# Patient Record
Sex: MALE | Race: WHITE | NOT HISPANIC OR LATINO | Employment: OTHER | URBAN - METROPOLITAN AREA
[De-identification: names, ages, dates, MRNs, and addresses within clinical notes are randomized per-mention and may not be internally consistent; named-entity substitution may affect disease eponyms.]

---

## 2023-11-17 ENCOUNTER — OFFICE VISIT (OUTPATIENT)
Age: 88
End: 2023-11-17
Payer: MEDICARE

## 2023-11-17 VITALS
DIASTOLIC BLOOD PRESSURE: 92 MMHG | HEART RATE: 86 BPM | WEIGHT: 170 LBS | BODY MASS INDEX: 24.34 KG/M2 | OXYGEN SATURATION: 98 % | RESPIRATION RATE: 16 BRPM | SYSTOLIC BLOOD PRESSURE: 150 MMHG | HEIGHT: 70 IN

## 2023-11-17 DIAGNOSIS — S81.011A LACERATION OF RIGHT KNEE, INITIAL ENCOUNTER: ICD-10-CM

## 2023-11-17 DIAGNOSIS — S01.81XA FACIAL LACERATION, INITIAL ENCOUNTER: ICD-10-CM

## 2023-11-17 DIAGNOSIS — S09.90XA INJURY OF HEAD, INITIAL ENCOUNTER: Primary | ICD-10-CM

## 2023-11-17 DIAGNOSIS — S61.411A LACERATION OF RIGHT HAND WITHOUT FOREIGN BODY, INITIAL ENCOUNTER: ICD-10-CM

## 2023-11-17 PROCEDURE — G0463 HOSPITAL OUTPT CLINIC VISIT: HCPCS | Performed by: EMERGENCY MEDICINE

## 2023-11-17 PROCEDURE — 99203 OFFICE O/P NEW LOW 30 MIN: CPT | Performed by: EMERGENCY MEDICINE

## 2023-11-17 RX ORDER — MEMANTINE HYDROCHLORIDE 10 MG/1
TABLET ORAL
COMMUNITY

## 2023-11-17 RX ORDER — ACETAMINOPHEN 500 MG
TABLET ORAL
COMMUNITY

## 2023-11-17 RX ORDER — TAMSULOSIN HYDROCHLORIDE 0.4 MG/1
CAPSULE ORAL
COMMUNITY

## 2023-11-17 RX ORDER — CARVEDILOL 12.5 MG/1
3.12 TABLET ORAL
COMMUNITY

## 2023-11-17 RX ORDER — SERTRALINE HYDROCHLORIDE 25 MG/1
25 TABLET, FILM COATED ORAL DAILY
COMMUNITY
Start: 2023-11-13

## 2023-11-17 RX ORDER — LOSARTAN POTASSIUM 25 MG/1
TABLET ORAL
COMMUNITY

## 2023-11-17 RX ORDER — AMIODARONE HYDROCHLORIDE 200 MG/1
TABLET ORAL
COMMUNITY

## 2023-11-17 RX ORDER — POTASSIUM CHLORIDE 750 MG/1
TABLET, FILM COATED, EXTENDED RELEASE ORAL
COMMUNITY

## 2023-11-17 RX ORDER — DOCUSATE SODIUM 100 MG/1
100 CAPSULE, LIQUID FILLED ORAL 2 TIMES DAILY
COMMUNITY

## 2023-11-17 RX ORDER — RIVAROXABAN 20 MG/1
TABLET, FILM COATED ORAL
COMMUNITY

## 2023-11-17 RX ORDER — FUROSEMIDE 20 MG/1
TABLET ORAL
COMMUNITY

## 2023-11-17 NOTE — PROGRESS NOTES
[facility]        NAME: Donte Gutiérrez is a 81 y.o. male  : 1935    MRN: [id number]  DATE: 2023  TIME: 3:55 PM    Assessment and Plan   Injury of head, initial encounter [S09.90XA]  1. Injury of head, initial encounter  Transfer to other facility      2. Facial laceration, initial encounter  Transfer to other facility      3. Laceration of right hand without foreign body, initial encounter  Transfer to other facility      4. Laceration of right knee, initial encounter  Transfer to other facility        Wounds cleansed and dressed by RN. NEXUS Head CT Instrument from [employer] on 2023  ** All calculations should be rechecked by clinician prior to use **     RESULT SUMMARY:  HIGH risk of significant intracranial injuries  CT necessary      INPUTS:  Evidence of significant skull fracture --> 0 = No  Scalp hematoma --> 0 = No  Neurologic deficit --> 0 = No  Altered level of alertness --> 0 = unknown  Abnormal behavior --> 0 = No  Coagulopathy --> 0 = Yes  Persistent vomiting --> 0 = No  Age ? 65 years --> 0 = Yes  Patient Instructions     Patient Instructions   Head Injury   WHAT YOU NEED TO KNOW:   A head injury can include your scalp, face, skull, or brain and range from mild to severe. Effects can appear immediately after the injury or develop later. The effects may last a short time or be permanent. Healthcare providers may want to check your recovery over time. Treatment may change as you recover or develop new health problems from the head injury. DISCHARGE INSTRUCTIONS:   Call your local emergency number (911 in the [place]), or have someone else call if:   You cannot be woken. You have a seizure. You stop responding to others or you faint. You have blurry or double vision. Your speech becomes slurred or confused. You have arm or leg weakness, loss of feeling, or new problems with coordination.     Your pupils are larger than usual, or one pupil is a different size than the other. You have blood or clear fluid coming out of your ears or nose. Return to the emergency department if:   You have repeated or forceful vomiting. You feel confused. Your headache gets worse or becomes severe. You or someone caring for you notices that you are harder to wake than usual.    Call your doctor if:   Your symptoms last longer than 6 weeks after the injury. You have questions or concerns about your condition or care. Medicines:   Acetaminophen  decreases pain and fever. It is available without a doctor's order. Ask how much to take and how often to take it. Follow directions. Read the labels of all other medicines you are using to see if they also contain acetaminophen, or ask your doctor or pharmacist. Acetaminophen can cause liver damage if not taken correctly. Take your medicine as directed. Contact your healthcare provider if you think your medicine is not helping or if you have side effects. Tell your provider if you are allergic to any medicine. Keep a list of the medicines, vitamins, and herbs you take. Include the amounts, and when and why you take them. Bring the list or the pill bottles to follow-up visits. Carry your medicine list with you in case of an emergency. Self-care:   Rest  or do quiet activities. Limit your time watching TV, using the computer, or doing tasks that require a lot of thinking. Slowly return to your normal activities as directed. Do not play sports or do activities that may cause you to get hit in the head. Ask your healthcare provider when you can return to sports. Apply ice  on your head for 15 to 20 minutes every hour or as directed. Use an ice pack, or put crushed ice in a plastic bag. Cover it with a towel before you apply it to your skin. Ice helps prevent tissue damage and decreases swelling and pain. Have someone stay with you for 24 hours  , or as directed.  This person can monitor you for problems and call for help if needed. When you are awake, the person should ask you a few questions every few hours to see if you are thinking clearly. An example is to ask your name or address. Prevent another head injury:   Wear a helmet that fits properly. Do this when you play sports, or ride a bike, scooter, or skateboard. Helmets help decrease your risk for a serious head injury. Talk to your healthcare provider about other ways you can protect yourself if you play sports. Wear your seatbelt every time you are in a car. This helps lower your risk for a head injury if you are in a car accident. Follow up with your doctor as directed:  Write down your questions so you remember to ask them during your visits. © Copyright Elisha [employer] 2023 Information is for End User's use only and may not be sold, redistributed or otherwise used for commercial purposes. The above information is an  only. It is not intended as medical advice for individual conditions or treatments. Talk to your doctor, nurse or pharmacist before following any medical regimen to see if it is safe and effective for you. Follow up with PCP in 3-5 days. Proceed to  ER if symptoms worsen. Chief Complaint     Chief Complaint   Patient presents with    Head Laceration     Gauri Basurto today around 2:50pm. Unwitnessed fall. Presents with right brow laceration, right hand palm laceration, right knee laceration. History of Present Illness       Patient with lacerations to face, right hand, right knee after unwitnessed fall 1 hour ago according to wife. Patient has history of dementia. Patient is on Xarelto. It is unknown whether patient had a loss of consciousness as he was found walking outside his home, bleeding from face, right hand and right knee by a fuel oil deliveryman, who did not witness patient's fall.     Head Laceration  Pertinent negatives include no arthralgias, chills, fever, headaches, joint swelling, neck pain, numbness, rash or weakness. Review of Systems   Review of Systems   Constitutional: Negative. Negative for chills and fever. HENT: Negative. Respiratory: Negative. Cardiovascular: Negative. Gastrointestinal: Negative. Endocrine: Negative. Genitourinary: Negative. Musculoskeletal: Negative. Negative for arthralgias, back pain, joint swelling, neck pain and neck stiffness. Skin:  Positive for wound. Negative for color change and rash. Neurological:  Negative for dizziness, tremors, seizures, syncope, facial asymmetry, speech difficulty, weakness, light-headedness, numbness and headaches. Psychiatric/Behavioral:  Negative for confusion and decreased concentration.           Current Medications       Current Outpatient Medications:     acetaminophen (TYLENOL) 500 mg tablet, Take by mouth, Disp: , Rfl:     amiodarone 200 mg tablet, , Disp: , Rfl:     carvedilol (COREG) 12.5 mg tablet, Take 3.125 mg by mouth, Disp: , Rfl:     docusate sodium (COLACE) 100 mg capsule, Take 100 mg by mouth 2 (two) times a day, Disp: , Rfl:     furosemide (LASIX) 20 mg tablet, 40mg on Monday and thursdays, Disp: , Rfl:     losartan (COZAAR) 25 mg tablet, Take by mouth, Disp: , Rfl:     memantine (NAMENDA) 10 mg tablet, , Disp: , Rfl:     potassium chloride (Klor-Con) 10 mEq tablet, , Disp: , Rfl:     sertraline (ZOLOFT) 25 mg tablet, Take 25 mg by mouth daily, Disp: , Rfl:     tamsulosin (FLOMAX) 0.4 mg, , Disp: , Rfl:     Xarelto 20 MG tablet, Take by mouth, Disp: , Rfl:     Current Allergies     Allergies as of 11/17/2023    (No Known Allergies)            The following portions of the patient's history were reviewed and updated as appropriate: allergies, current medications, past family history, past medical history, past social history, past surgical history and problem list.     Past Medical History:   Diagnosis Date    Dementia ([address])     History of cardiac defibrillator placement     Kidney stone        History reviewed. No pertinent surgical history. History reviewed. No pertinent family history. Medications have been verified. Objective   /92   Pulse 86   Resp 16   Ht 5' 10" (1.778 m)   Wt 77.1 kg (170 lb)   SpO2 98%   BMI 24.39 kg/m²        Physical Exam     Physical Exam  Vitals and nursing note reviewed. Constitutional:       General: He is not in acute distress. Appearance: He is well-developed. He is not ill-appearing. HENT:      Head: Normocephalic. Eyes:      Conjunctiva/sclera: Conjunctivae normal.      Pupils: Pupils are equal, round, and reactive to light. Cardiovascular:      Rate and Rhythm: Normal rate and regular rhythm. Pulmonary:      Effort: Pulmonary effort is normal.      Breath sounds: Normal breath sounds. Musculoskeletal:      Cervical back: Normal range of motion and neck supple. No rigidity. Skin:     General: Skin is warm and dry. Findings: No rash. Neurological:      Mental Status: He is alert.    Psychiatric:         Mood and Affect: Mood normal.

## 2023-11-17 NOTE — PATIENT INSTRUCTIONS
Head Injury   WHAT YOU NEED TO KNOW:   A head injury can include your scalp, face, skull, or brain and range from mild to severe. Effects can appear immediately after the injury or develop later. The effects may last a short time or be permanent. Healthcare providers may want to check your recovery over time. Treatment may change as you recover or develop new health problems from the head injury. DISCHARGE INSTRUCTIONS:   Call your local emergency number (911 in the [place]), or have someone else call if:   You cannot be woken. You have a seizure. You stop responding to others or you faint. You have blurry or double vision. Your speech becomes slurred or confused. You have arm or leg weakness, loss of feeling, or new problems with coordination. Your pupils are larger than usual, or one pupil is a different size than the other. You have blood or clear fluid coming out of your ears or nose. Return to the emergency department if:   You have repeated or forceful vomiting. You feel confused. Your headache gets worse or becomes severe. You or someone caring for you notices that you are harder to wake than usual.    Call your doctor if:   Your symptoms last longer than 6 weeks after the injury. You have questions or concerns about your condition or care. Medicines:   Acetaminophen  decreases pain and fever. It is available without a doctor's order. Ask how much to take and how often to take it. Follow directions. Read the labels of all other medicines you are using to see if they also contain acetaminophen, or ask your doctor or pharmacist. Acetaminophen can cause liver damage if not taken correctly. Take your medicine as directed. Contact your healthcare provider if you think your medicine is not helping or if you have side effects. Tell your provider if you are allergic to any medicine. Keep a list of the medicines, vitamins, and herbs you take.  Include the amounts, and when and why you take them. Bring the list or the pill bottles to follow-up visits. Carry your medicine list with you in case of an emergency. Self-care:   Rest  or do quiet activities. Limit your time watching TV, using the computer, or doing tasks that require a lot of thinking. Slowly return to your normal activities as directed. Do not play sports or do activities that may cause you to get hit in the head. Ask your healthcare provider when you can return to sports. Apply ice  on your head for 15 to 20 minutes every hour or as directed. Use an ice pack, or put crushed ice in a plastic bag. Cover it with a towel before you apply it to your skin. Ice helps prevent tissue damage and decreases swelling and pain. Have someone stay with you for 24 hours  , or as directed. This person can monitor you for problems and call for help if needed. When you are awake, the person should ask you a few questions every few hours to see if you are thinking clearly. An example is to ask your name or address. Prevent another head injury:   Wear a helmet that fits properly. Do this when you play sports, or ride a bike, scooter, or skateboard. Helmets help decrease your risk for a serious head injury. Talk to your healthcare provider about other ways you can protect yourself if you play sports. Wear your seatbelt every time you are in a car. This helps lower your risk for a head injury if you are in a car accident. Follow up with your doctor as directed:  Write down your questions so you remember to ask them during your visits. © Copyright [facility] 2023 Information is for End User's use only and may not be sold, redistributed or otherwise used for commercial purposes. The above information is an  only. It is not intended as medical advice for individual conditions or treatments. Talk to your doctor, nurse or pharmacist before following any medical regimen to see if it is safe and effective for you.

## 2024-01-04 ENCOUNTER — OFFICE VISIT (OUTPATIENT)
Age: 89
End: 2024-01-04
Payer: MEDICARE

## 2024-01-04 ENCOUNTER — APPOINTMENT (OUTPATIENT)
Age: 89
End: 2024-01-04
Payer: MEDICARE

## 2024-01-04 VITALS
RESPIRATION RATE: 16 BRPM | TEMPERATURE: 95.4 F | SYSTOLIC BLOOD PRESSURE: 142 MMHG | DIASTOLIC BLOOD PRESSURE: 94 MMHG | OXYGEN SATURATION: 98 % | HEIGHT: 67 IN | WEIGHT: 166 LBS | HEART RATE: 73 BPM | BODY MASS INDEX: 26.06 KG/M2

## 2024-01-04 DIAGNOSIS — R21 SKIN RASH: ICD-10-CM

## 2024-01-04 DIAGNOSIS — R05.8 POST-VIRAL COUGH SYNDROME: ICD-10-CM
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
## 2024-01-04 DIAGNOSIS — J20.9 ACUTE BRONCHITIS, UNSPECIFIED ORGANISM: ICD-10-CM

## 2024-01-04 DIAGNOSIS — S70.01XA CONTUSION OF RIGHT HIP, INITIAL ENCOUNTER: Primary | ICD-10-CM

## 2024-01-04 PROCEDURE — 99214 OFFICE O/P EST MOD 30 MIN: CPT | Performed by: PHYSICIAN ASSISTANT

## 2024-01-04 PROCEDURE — G0463 HOSPITAL OUTPT CLINIC VISIT: HCPCS | Performed by: PHYSICIAN ASSISTANT

## 2024-01-04 PROCEDURE — 71046 X-RAY EXAM CHEST 2 VIEWS: CPT

## 2024-01-04 PROCEDURE — 73502 X-RAY EXAM HIP UNI 2-3 VIEWS: CPT

## 2024-01-04 RX ORDER — CLOTRIMAZOLE AND BETAMETHASONE DIPROPIONATE 10; .64 MG/G; MG/G
CREAM TOPICAL 2 TIMES DAILY
Qty: 45 G | Refills: 0 | Status: SHIPPED | OUTPATIENT
Start: 2024-01-04

## 2024-01-04 RX ORDER — AMPICILLIN TRIHYDRATE 250 MG
CAPSULE ORAL
COMMUNITY

## 2024-01-04 RX ORDER — UBIDECARENONE 100 MG
CAPSULE ORAL
COMMUNITY

## 2024-01-04 RX ORDER — LANOLIN ALCOHOL/MO/W.PET/CERES
CREAM (GRAM) TOPICAL
COMMUNITY

## 2024-01-04 RX ORDER — PREDNISONE 10 MG/1
TABLET ORAL
Qty: 21 TABLET | Refills: 0 | Status: SHIPPED | OUTPATIENT
Start: 2024-01-04

## 2024-01-04 RX ORDER — BENZONATATE 100 MG/1
100 CAPSULE ORAL 3 TIMES DAILY PRN
Qty: 20 CAPSULE | Refills: 0 | Status: SHIPPED | OUTPATIENT
Start: 2024-01-04

## 2024-01-04 RX ORDER — PREDNISONE 50 MG/1
TABLET ORAL
COMMUNITY
End: 2024-01-04

## 2024-01-04 RX ORDER — CARVEDILOL 3.12 MG/1
3.12 TABLET ORAL 2 TIMES DAILY
COMMUNITY

## 2024-01-04 RX ORDER — DOXYCYCLINE 100 MG/1
100 CAPSULE ORAL 2 TIMES DAILY
Qty: 14 CAPSULE | Refills: 0 | Status: SHIPPED | OUTPATIENT
Start: 2024-01-04 | End: 2024-01-11

## 2024-01-04 RX ORDER — ERYTHROMYCIN 5 MG/G
OINTMENT OPHTHALMIC
COMMUNITY

## 2024-01-04 RX ORDER — TRIAMCINOLONE ACETONIDE 1 MG/G
CREAM TOPICAL
COMMUNITY

## 2024-01-04 RX ORDER — CODEINE PHOSPHATE AND GUAIFENESIN 10; 100 MG/5ML; MG/5ML
SOLUTION ORAL
COMMUNITY
Start: 2023-12-26

## 2024-01-04 NOTE — PATIENT INSTRUCTIONS
Hip Contusion   WHAT YOU NEED TO KNOW:   A hip contusion is a bruise that appears on the skin of your hip after an injury. A bruise happens when small blood vessels tear but the skin does not. When blood vessels tear, blood leaks into nearby tissue, such as soft tissue or muscle.  DISCHARGE INSTRUCTIONS:   Return to the emergency department if:   You have severe pain in your hip.    You have numbness in your leg or toes.    You cannot put any weight on or move your hip.    Call your doctor if:   Your pain does not decrease, even after treatment.    You have questions or concerns about your condition or care.    Medicines:   NSAIDs , such as ibuprofen, help decrease swelling, pain, and fever. This medicine is available with or without a doctor's order. NSAIDs can cause stomach bleeding or kidney problems in certain people. If you take blood thinner medicine, always ask if NSAIDs are safe for you. Always read the medicine label and follow directions. Do not give these medicines to children younger than 6 months without direction from a healthcare provider.     Take your medicine as directed.  Contact your healthcare provider if you think your medicine is not helping or if you have side effects. Tell your provider if you are allergic to any medicine. Keep a list of the medicines, vitamins, and herbs you take. Include the amounts, and when and why you take them. Bring the list or the pill bottles to follow-up visits. Carry your medicine list with you in case of an emergency.    Self-care:   Rest  your injured hip so that it can heal. You may need to avoid putting any weight on your hip for at least 48 hours. Return to normal activities as directed.    Ice  the injury for 20 minutes every 4 hours, or as directed. Use an ice pack, or put crushed ice in a plastic bag. Cover it with a towel to protect your skin. Ice helps prevent tissue damage and decreases swelling and pain.    Compress  your injury with an elastic bandage  to reduce swelling. Ask your healthcare provider how to use an elastic bandage and how tight it should be.    Elevate  your injured hip above the level of your heart as often as you can. This will help decrease swelling and pain. If possible, prop your hip and leg on pillows or blankets to keep it elevated comfortably.    Help your hip contusion heal:   Do not massage or use heat.  Heat and massage may slow healing of the area.    Do not stretch injured muscles.  Ask your healthcare provider when and how you may safely stretch after your injury.    Do not drink alcohol.  Alcohol may slow healing of your injury.    Prevent another contusion:   Stretch and warm up before you play sports or exercise.    Wear protective gear when you play sports.    If you begin a new physical activity, start slowly to give your body a chance to adjust.    Follow up with your doctor as directed:  You may need to return within a week to recheck your injury. Write down any questions you have so you remember to ask them during your visits.  © Copyright [employer] 2023 Information is for End User's use only and may not be sold, redistributed or otherwise used for commercial purposes.  The above information is an  only. It is not intended as medical advice for individual conditions or treatments. Talk to your doctor, nurse or pharmacist before following any medical regimen to see if it is safe and effective for you.      Acute Bronchitis   WHAT YOU NEED TO KNOW:   Acute bronchitis is swelling and irritation in your lungs. It is usually caused by a virus and most often happens in the winter. Bronchitis may also be caused by bacteria or by a chemical irritant, such as smoke.  DISCHARGE INSTRUCTIONS:   Return to the emergency department if:   You cough up blood.    Your lips or fingernails turn blue.    You feel like you are not getting enough air when you breathe.    Call your doctor if:   Your symptoms do not go away or get  worse, even after treatment.    Your cough does not get better within 4 weeks.    You have questions or concerns about your condition or care.    Medicines:  You may need any of the following:  Cough suppressants  decrease your urge to cough.    Decongestants  help loosen mucus in your lungs and make it easier to cough up. This can help you breathe easier.    Inhalers  may be given. Your healthcare provider may give you one or more inhalers to help you breathe easier and cough less. An inhaler gives you medicine to open your airways. Ask your healthcare provider to show you how to use your inhaler correctly.         Antiviral medicine  treats infections caused by a virus.    Antibiotics  may be given if your bronchitis is caused by bacteria or if you have lung condition.    Acetaminophen  decreases pain and fever. It is available without a doctor's order. Ask how much to take and how often to take it. Follow directions. Read the labels of all other medicines you are using to see if they also contain acetaminophen, or ask your doctor or pharmacist. Acetaminophen can cause liver damage if not taken correctly.    NSAIDs  help decrease swelling and pain or fever. This medicine is available with or without a doctor's order. NSAIDs can cause stomach bleeding or kidney problems in certain people. If you take blood thinner medicine, always ask your healthcare provider if NSAIDs are safe for you. Always read the medicine label and follow directions.    Self-care:   Drink liquids as directed.  You may need to drink more liquids than usual to stay hydrated. Ask how much liquid to drink each day and which liquids are best for you.    Use a cool mist humidifier.  This increases air moisture in your home. This may make it easier for you to breathe and help decrease your cough.     Get more rest.  Rest helps your body to heal. Slowly start to do more each day. Rest when you feel it is needed.    Prevent acute bronchitis:       Ask  about vaccines you may need.  Get a flu vaccine each year as soon as recommended, usually in September or October. Ask your healthcare provider if you should also get a pneumonia or COVID-19 vaccine. Your healthcare provider can tell you if you should also get other vaccines, and when to get them.    Prevent the spread of germs.  You can decrease your risk for acute bronchitis and other illnesses by doing the following:     Wash your hands often with soap and water. Carry germ-killing hand lotion or gel with you. You can use the lotion or gel to clean your hands when soap and water are not available.         Do not touch your eyes, nose, or mouth unless you have washed your hands first.    Always cover your mouth when you cough to prevent the spread of germs. It is best to cough into a tissue or your shirt sleeve instead of into your hand. Ask those around you to cover their mouths when they cough.    Try to avoid people who have a cold or the flu. If you are sick, stay away from others as much as possible.    Avoid irritants in the air.  Avoid chemicals, fumes, and dust. Wear a face mask if you must work around dust or fumes. Stay inside on days when air pollution levels are high. If you have allergies, stay inside when pollen counts are high. Do not use aerosol products, such as spray-on deodorant, bug spray, and hair spray.    Do not smoke or be around others who are smoking.  Nicotine and other chemicals in cigarettes and cigars can cause lung damage. Ask your healthcare provider for information if you currently smoke and need help to quit. E-cigarettes or smokeless tobacco still contain nicotine. Talk to your healthcare provider before you use these products.  Follow up with your doctor as directed:  Write down questions you have so you will remember to ask them during your follow-up visits.  © Copyright [employer] 2023 Information is for End User's use only and may not be sold, redistributed or otherwise used  for commercial purposes.  The above information is an  only. It is not intended as medical advice for individual conditions or treatments. Talk to your doctor, nurse or pharmacist before following any medical regimen to see if it is safe and effective for you.      Dermatitis   WHAT YOU NEED TO KNOW:   Dermatitis is skin inflammation. You may have an itchy rash, redness, or swelling. You may also have bumps or blisters that crust over or ooze clear fluid. Dermatitis can be caused by allergens such as dust mites, pet dander, pollen, and certain foods. It can also develop when something touches your skin and irritates it or causes an allergic reaction. Examples include soaps, chemicals, latex, and poison ivy.  DISCHARGE INSTRUCTIONS:   Call your local emergency number (911 in the [place]) or have someone call if:   You have symptoms of anaphylaxis, such as sudden trouble breathing, throat swelling, or feeling dizzy or lightheaded.      Return to the emergency department if:   You develop a fever or have red streaks going up your arm or leg.    Your rash gets more swollen, red, or hot.    Call your doctor or dermatologist if:   Your skin blisters, oozes white or yellow pus, or has a foul-smelling discharge.    Your rash spreads or does not get better, even after treatment.    You have questions or concerns about your condition or care.    Medicines:   Medicines  help decrease itching and inflammation, or treat a bacterial infection. They may be given as a topical cream, shot, or a pill.    Take your medicine as directed.  Contact your healthcare provider if you think your medicine is not helping or if you have side effects. Tell your provider if you are allergic to any medicine. Keep a list of the medicines, vitamins, and herbs you take. Include the amounts, and when and why you take them. Bring the list or the pill bottles to follow-up visits. Carry your medicine list with you in case of an  emergency.    Manage dermatitis:   Apply a cool compress to your rash.  This will help soothe your skin.    Apply lotions or creams to the area.  These help keep your skin moist and decrease itching. Apply the lotion or cream right after a lukewarm bath or shower when your skin is still damp. Use products that do not contain dye or a scent.    Avoid skin irritants.  Examples include makeup, hair products, soaps, and cleansers. Use products that do not contain a scent or dye.    Follow up with your doctor or dermatologist as directed:  Write down your questions so you remember to ask them during your visits.  © Copyright [employer] 2023 Information is for End User's use only and may not be sold, redistributed or otherwise used for commercial purposes.  The above information is an  only. It is not intended as medical advice for individual conditions or treatments. Talk to your doctor, nurse or pharmacist before following any medical regimen to see if it is safe and effective for you.

## 2024-01-05 NOTE — PROGRESS NOTES
Discussed CXR result with patient's wife (POA).  Patient afebrile today according to wife, still with productive cough.  I advised her to continue the antibiotic as prescribed continue the Tessalon as directed and have him return here or to ER if condition worsens or new symptoms develop.

## 2024-01-14 NOTE — PROGRESS NOTES
[facility]        NAME: Alfredo Thorne is a 88 y.o. male  : 1935    MRN: [id number]  DATE: 2024  TIME: 8:28 AM    Assessment and Plan   Contusion of right hip, initial encounter [S70.01XA]  1. Contusion of right hip, initial encounter        2. Fall, initial encounter  XR hip/pelv 2-3 vws right if performed      3. Post-viral cough syndrome  XR chest pa & lateral    benzonatate (TESSALON PERLES) 100 mg capsule      4. Acute bronchitis, unspecified organism  doxycycline monohydrate (MONODOX) 100 mg capsule    benzonatate (TESSALON PERLES) 100 mg capsule    predniSONE 10 mg tablet      5. Skin rash  clotrimazole-betamethasone (LOTRISONE) 1-0.05 % cream            Patient Instructions       Follow up with PCP in 3-5 days.  Proceed to  ER if symptoms worsen.    Chief Complaint     Chief Complaint   Patient presents with   • Fall     Fall yesterday morning in bathroom, hit right hip on toilet, bruised right arm and right side, wife has photos of bathroom. Hip hurts to sit and has difficulty getting in and out of the car.    • Cough     Covid positive 23. Cough began on 23. Still has the cough. Took mucinex, tylenol for a fever he had for one week, fever resolved for one week. Cough is deep and producing mucus, given guaif/codeine since 23 intermittently.          History of Present Illness       HPI    Review of Systems   Review of Systems      Current Medications       Current Outpatient Medications:   •  acetaminophen (TYLENOL) 500 mg tablet, Take by mouth, Disp: , Rfl:   •  amiodarone 200 mg tablet, , Disp: , Rfl:   •  benzonatate (TESSALON PERLES) 100 mg capsule, Take 1 capsule (100 mg total) by mouth 3 (three) times a day as needed for cough, Disp: 20 capsule, Rfl: 0  •  carvedilol (COREG) 3.125 mg tablet, Take 3.125 mg by mouth 2 (two) times a day, Disp: , Rfl:   •  clotrimazole-betamethasone (LOTRISONE) 1-0.05 % cream, Apply topically 2 (two) times a day, Disp: 45 g,  Rfl: 0  •  docusate sodium (COLACE) 100 mg capsule, Take 100 mg by mouth 2 (two) times a day, Disp: , Rfl:   •  furosemide (LASIX) 20 mg tablet, 40mg on Monday and thursdays, Disp: , Rfl:   •  guaiFENesin-codeine (ROBITUSSIN AC) 100-10 mg/5 mL oral solution, TAKE 5 ML BY MOUTH EVERY 4 HOURS AS NEEDED FOR COUGH, Disp: , Rfl:   •  losartan (COZAAR) 25 mg tablet, Take by mouth, Disp: , Rfl:   •  memantine (NAMENDA) 10 mg tablet, , Disp: , Rfl:   •  potassium chloride (Klor-Con) 10 mEq tablet, , Disp: , Rfl:   •  predniSONE 10 mg tablet, 6-5-4-3-2-1 taper with food., Disp: 21 tablet, Rfl: 0  •  sertraline (ZOLOFT) 25 mg tablet, Take 25 mg by mouth daily, Disp: , Rfl:   •  tamsulosin (FLOMAX) 0.4 mg, , Disp: , Rfl:   •  Xarelto 20 MG tablet, Take by mouth, Disp: , Rfl:   •  carvedilol (COREG) 12.5 mg tablet, Take 3.125 mg by mouth (Patient not taking: Reported on 1/4/2024), Disp: , Rfl:   •  Cinnamon 500 MG capsule, , Disp: , Rfl:   •  co-enzyme Q-10 100 mg capsule, Take by mouth (Patient not taking: Reported on 1/4/2024), Disp: , Rfl:   •  dronedarone (Multaq) 400 mg tablet, Take by mouth (Patient not taking: Reported on 1/4/2024), Disp: , Rfl:   •  erythromycin (ILOTYCIN) ophthalmic ointment, , Disp: , Rfl:   •  Magnesium Oxide (DIASENSE MAGNESIUM PO), Take by mouth (Patient not taking: Reported on 1/4/2024), Disp: , Rfl:   •  magnesium Oxide (MAG-OX) 400 mg TABS, Take by mouth (Patient not taking: Reported on 1/4/2024), Disp: , Rfl:   •  metFORMIN (GLUCOPHAGE) 500 mg tablet, Take by mouth (Patient not taking: Reported on 1/4/2024), Disp: , Rfl:   •  triamcinolone (KENALOG) 0.1 % cream, , Disp: , Rfl:     Current Allergies     Allergies as of 01/04/2024   • (No Known Allergies)            The following portions of the patient's history were reviewed and updated as appropriate: allergies, current medications, past family history, past medical history, past social history, past surgical history and problem list.     Past  "Medical History:   Diagnosis Date   • Dementia (HCC)    • History of cardiac defibrillator placement    • Kidney stone        History reviewed. No pertinent surgical history.    History reviewed. No pertinent family history.      Medications have been verified.        Objective   /94   Pulse 73   Temp (!) 95.4 °F (35.2 °C)   Resp 16   Ht 5' 7\" (1.702 m)   Wt 75.3 kg (166 lb)   SpO2 98%   BMI 26.00 kg/m²   No LMP for male patient.       Physical Exam     Physical Exam                "

## 2024-04-24 ENCOUNTER — TELEPHONE (OUTPATIENT)
Age: 89
End: 2024-04-24

## 2024-04-24 NOTE — TELEPHONE ENCOUNTER
Pt arrived to urgent care with spouse, Gema. Alfredo had an unwitnessed fall (about 30 minutes prior to arrival) at home while getting the mail. Gema heard him yelling her name and came to help him up. Alfredo is on blood thinners and has severe dementia. RN triaged pt and explained to Gema that it would be best for him to be evaluated at the ER but he can be seen at the urgent care first if she would like. Gema politely declined. Pt has a laceration on left eyebrow and is bleeding very slowly, pt holding a paper towel over site. Gema expressed interest in having urgent care staff call for an ambulance so pt would hopefully be seen at the ER faster. Staff stated they could call for ambulance but then she refused due to pt's intolerance. Wife, Gema, states that she will go home with pt first and see if their daughter would go with them to the ER. RN expressed the importance of getting him evaluated ASAP. Gema expressed understanding.